# Patient Record
(demographics unavailable — no encounter records)

---

## 2025-07-29 NOTE — REVIEW OF SYSTEMS
[Post Nasal Drip] : post nasal drip [Throat Clearing] : throat clearing [Throat Dryness] : throat dryness [Cough] : cough [Negative] : Heme/Lymph

## 2025-07-29 NOTE — HISTORY OF PRESENT ILLNESS
[de-identified] : Best Potts is a 68-year-old male who presents for evaluation of excessive throat clearing. He is accompanied by his son. He states for years he has had excessive throat clearing which has worsened over the past several months. He notes postnasal drainage. He denies sinus pressure, rhinorrhea or nasal congestion. He denies dysphagia, odynophagia, dyspnea, dysphonia, aspiration episodes. He notes infrequent heartburn after meals but has not had this recently. He denies globus sensation. Denies fevers.

## 2025-07-29 NOTE — ASSESSMENT
[FreeTextEntry1] : Best Potts presents for evaluation of chronic throat clearing. Flexible laryngoscopy was performed showing postcricoid inflammation indicating laryngopharyngeal reflux. Discussed antireflux precautions.  - f/u prn